# Patient Record
Sex: MALE | Race: BLACK OR AFRICAN AMERICAN | NOT HISPANIC OR LATINO | URBAN - METROPOLITAN AREA
[De-identification: names, ages, dates, MRNs, and addresses within clinical notes are randomized per-mention and may not be internally consistent; named-entity substitution may affect disease eponyms.]

---

## 2017-01-31 ENCOUNTER — OFFICE VISIT - RIVER FALLS (OUTPATIENT)
Dept: FAMILY MEDICINE | Facility: CLINIC | Age: 47
End: 2017-01-31

## 2017-01-31 ASSESSMENT — MIFFLIN-ST. JEOR: SCORE: 1743.48

## 2017-02-13 ENCOUNTER — OFFICE VISIT - RIVER FALLS (OUTPATIENT)
Dept: FAMILY MEDICINE | Facility: CLINIC | Age: 47
End: 2017-02-13

## 2017-02-13 ASSESSMENT — MIFFLIN-ST. JEOR: SCORE: 1721.71

## 2017-03-07 ENCOUNTER — OFFICE VISIT - RIVER FALLS (OUTPATIENT)
Dept: FAMILY MEDICINE | Facility: CLINIC | Age: 47
End: 2017-03-07

## 2017-03-07 ASSESSMENT — MIFFLIN-ST. JEOR: SCORE: 1726.25

## 2017-04-07 ENCOUNTER — OFFICE VISIT - RIVER FALLS (OUTPATIENT)
Dept: FAMILY MEDICINE | Facility: CLINIC | Age: 47
End: 2017-04-07

## 2017-04-07 ASSESSMENT — MIFFLIN-ST. JEOR: SCORE: 1720.8

## 2017-04-19 ENCOUNTER — OFFICE VISIT - RIVER FALLS (OUTPATIENT)
Dept: FAMILY MEDICINE | Facility: CLINIC | Age: 47
End: 2017-04-19

## 2017-05-08 ENCOUNTER — OFFICE VISIT - RIVER FALLS (OUTPATIENT)
Dept: FAMILY MEDICINE | Facility: CLINIC | Age: 47
End: 2017-05-08

## 2017-05-08 ASSESSMENT — MIFFLIN-ST. JEOR: SCORE: 1737.13

## 2021-07-05 ENCOUNTER — OFFICE VISIT - RIVER FALLS (OUTPATIENT)
Dept: FAMILY MEDICINE | Facility: CLINIC | Age: 51
End: 2021-07-05

## 2022-01-11 ENCOUNTER — OFFICE VISIT - RIVER FALLS (OUTPATIENT)
Dept: FAMILY MEDICINE | Facility: CLINIC | Age: 52
End: 2022-01-11

## 2022-02-11 VITALS
DIASTOLIC BLOOD PRESSURE: 68 MMHG | TEMPERATURE: 98.1 F | DIASTOLIC BLOOD PRESSURE: 80 MMHG | SYSTOLIC BLOOD PRESSURE: 124 MMHG | BODY MASS INDEX: 34.95 KG/M2 | BODY MASS INDEX: 35.45 KG/M2 | WEIGHT: 212.8 LBS | OXYGEN SATURATION: 98 % | HEART RATE: 70 BPM | SYSTOLIC BLOOD PRESSURE: 148 MMHG | WEIGHT: 210 LBS | TEMPERATURE: 98.2 F | HEART RATE: 45 BPM | HEIGHT: 65 IN

## 2022-02-11 VITALS
HEART RATE: 84 BPM | SYSTOLIC BLOOD PRESSURE: 126 MMHG | SYSTOLIC BLOOD PRESSURE: 122 MMHG | BODY MASS INDEX: 34.89 KG/M2 | HEART RATE: 63 BPM | DIASTOLIC BLOOD PRESSURE: 84 MMHG | OXYGEN SATURATION: 96 % | HEIGHT: 65 IN | WEIGHT: 209.4 LBS | WEIGHT: 214.2 LBS | HEIGHT: 65 IN | BODY MASS INDEX: 35.69 KG/M2 | TEMPERATURE: 97.5 F | DIASTOLIC BLOOD PRESSURE: 82 MMHG

## 2022-02-11 VITALS
WEIGHT: 210.4 LBS | TEMPERATURE: 97.2 F | HEIGHT: 65 IN | SYSTOLIC BLOOD PRESSURE: 152 MMHG | OXYGEN SATURATION: 98 % | HEART RATE: 66 BPM | SYSTOLIC BLOOD PRESSURE: 124 MMHG | TEMPERATURE: 98 F | BODY MASS INDEX: 35.06 KG/M2 | OXYGEN SATURATION: 98 % | HEIGHT: 65 IN | BODY MASS INDEX: 34.85 KG/M2 | WEIGHT: 209.2 LBS | HEART RATE: 55 BPM | DIASTOLIC BLOOD PRESSURE: 80 MMHG | DIASTOLIC BLOOD PRESSURE: 78 MMHG

## 2022-02-11 VITALS
SYSTOLIC BLOOD PRESSURE: 148 MMHG | BODY MASS INDEX: 34.28 KG/M2 | WEIGHT: 206 LBS | DIASTOLIC BLOOD PRESSURE: 81 MMHG | TEMPERATURE: 98.1 F | HEART RATE: 60 BPM

## 2022-02-16 NOTE — NURSING NOTE
Comprehensive Intake Entered On:  7/5/2021 2:16 PM CDT    Performed On:  7/5/2021 2:09 PM CDT by Rehana Olea               Summary   Chief Complaint :   Work Comp- Hit head on Microwave door yesterday, has HA, dizziness.   Weight Measured :   206.0 lb(Converted to: 206 lb 0 oz, 93.440 kg)    Systolic Blood Pressure :   148 mmHg (HI)    Diastolic Blood Pressure :   81 mmHg (HI)    Mean Arterial Pressure :   103 mmHg   Peripheral Pulse Rate :   60 bpm   BP Site :   Right arm   BP Method :   Electronic   HR Method :   Electronic   Temperature Tympanic :   98.1 DegF(Converted to: 36.7 DegC)    Rehana Olea - 7/5/2021 2:09 PM CDT   Health Status   Allergies Verified? :   Yes   Medication History Verified? :   Yes   Medical History Verified? :   Yes   Pre-Visit Planning Status :   Completed   Tobacco Use? :   Former smoker   Rehana Olea - 7/5/2021 2:09 PM CDT   Consents   Consent for Immunization Exchange :   Consent Granted   Consent for Immunizations to Providers :   Consent Granted   Rehana Olea - 7/5/2021 2:09 PM CDT   Meds / Allergies   (As Of: 7/5/2021 2:16:45 PM CDT)   Allergies (Active)   amitriptyline  Estimated Onset Date:   Unspecified ; Reactions:   rash ; Created By:   Derek Garcia MD; Reaction Status:   Active ; Category:   Drug ; Substance:   amitriptyline ; Type:   Allergy ; Updated By:   Derek Garcia MD; Reviewed Date:   7/5/2021 2:16 PM CDT      Diflucan  Estimated Onset Date:   Unspecified ; Created By:   Rach Manzano; Reaction Status:   Active ; Category:   Drug ; Substance:   Diflucan ; Type:   Allergy ; Updated By:   Rach Manzano; Reviewed Date:   7/5/2021 2:16 PM CDT      Nizoral  Estimated Onset Date:   Unspecified ; Created By:   Rach Manzano; Reaction Status:   Active ; Category:   Drug ; Substance:   Nizoral ; Type:   Allergy ; Updated By:   Rach Manzano; Reviewed Date:   7/5/2021 2:16 PM CDT      predniSONE  Estimated Onset Date:    Unspecified ; Reactions:   increased depression ; Created By:   Mia Mac MA; Reaction Status:   Active ; Category:   Drug ; Substance:   predniSONE ; Type:   Allergy ; Updated By:   Mia Mac MA; Reviewed Date:   7/5/2021 2:16 PM CDT      Vicodin  Estimated Onset Date:   Unspecified ; Reactions:   Vomiting ; Created By:   Rach Manzano; Reaction Status:   Active ; Category:   Drug ; Substance:   Vicodin ; Type:   Allergy ; Updated By:   Rach Manzano; Reviewed Date:   7/5/2021 2:16 PM CDT      Visine  Estimated Onset Date:   Unspecified ; Created By:   Rach Manzano; Reaction Status:   Active ; Category:   Drug ; Substance:   Visine ; Type:   Allergy ; Updated By:   Rach Manzano; Reviewed Date:   7/5/2021 2:16 PM CDT        Medication List   (As Of: 7/5/2021 2:16:45 PM CDT)   Prescription/Discharge Order    metoprolol  :   metoprolol ; Status:   Prescribed ; Ordered As Mnemonic:   metoprolol succinate 50 mg oral tablet, extended release ; Simple Display Line:   50 mg, 1 tab(s), PO, Daily, 30 tab(s), 5 Refill(s) ; Ordering Provider:   Saeid Rosado MD; Catalog Code:   metoprolol ; Order Dt/Tm:   1/31/2017 1:29:11 PM CST          amoxicillin-clavulanate  :   amoxicillin-clavulanate ; Status:   Completed ; Ordered As Mnemonic:   Augmentin 875 mg oral tablet ; Simple Display Line:   1 tab(s), po, bid, for 7 day(s), 14 tab(s), 0 Refill(s) ; Ordering Provider:   Saeid Rosado MD; Catalog Code:   amoxicillin-clavulanate ; Order Dt/Tm:   5/8/2017 2:19:13 PM CDT            Home Meds    venlafaxine  :   venlafaxine ; Status:   Documented ; Ordered As Mnemonic:   Effexor  mg oral capsule, extended release ; Simple Display Line:   150 mg, 1 cap(s), PO, daily, 0 Refill(s) ; Catalog Code:   venlafaxine ; Order Dt/Tm:   10/12/2015 5:51:22 PM CDT ; Comment:   change from bid to daily          omeprazole  :   omeprazole ; Status:   Documented ; Ordered As Mnemonic:    omeprazole 40 mg oral delayed release capsule ; Simple Display Line:   40 mg, 1 cap(s), Oral, daily, 90 cap(s), 0 Refill(s) ; Catalog Code:   omeprazole ; Order Dt/Tm:   7/5/2021 2:14:24 PM CDT          multivitamin with minerals  :   multivitamin with minerals ; Status:   Documented ; Ordered As Mnemonic:   multivitamin with minerals (w/ Iron) ; Simple Display Line:   0 Refill(s) ; Catalog Code:   multivitamin with minerals ; Order Dt/Tm:   8/9/2016 10:00:54 AM CDT          meclizine  :   meclizine ; Status:   Documented ; Ordered As Mnemonic:   meclizine 25 mg oral tablet ; Simple Display Line:   25 mg, 1 tab(s), Oral, tid, PRN: for nausea/vomiting, 30 tab(s), 0 Refill(s) ; Catalog Code:   meclizine ; Order Dt/Tm:   7/5/2021 2:16:39 PM CDT          lithium  :   lithium ; Status:   Completed ; Ordered As Mnemonic:   lithium 450 mg oral tablet, extended release ; Simple Display Line:   900 mg, 2 tab(s), PO, hs, 0 Refill(s) ; Catalog Code:   lithium ; Order Dt/Tm:   10/12/2015 5:52:06 PM CDT          cloNIDine  :   cloNIDine ; Status:   Documented ; Ordered As Mnemonic:   cloNIDine 0.1 mg oral tablet ; Simple Display Line:   0.1 mg, 1 tab(s), po, daily, at HS, 0 Refill(s) ; Catalog Code:   cloNIDine ; Order Dt/Tm:   4/6/2016 3:14:32 PM CDT          albuterol  :   albuterol ; Status:   Documented ; Ordered As Mnemonic:   albuterol 90 mcg/inh inhalation aerosol ; Simple Display Line:   2 puff(s), INH, QID, 51 g, 0 Refill(s) ; Catalog Code:   albuterol ; Order Dt/Tm:   12/29/2016 9:32:08 AM CST          albuterol-ipratropium  :   albuterol-ipratropium ; Status:   Documented ; Ordered As Mnemonic:   albuterol-ipratropium 2.5 mg-0.5 mg/3 mL inhalation solution ; Simple Display Line:   3 mL, neb, qid, 0 Refill(s) ; Catalog Code:   albuterol-ipratropium ; Order Dt/Tm:   12/28/2016 1:15:56 PM CST            Social History   Social History   (As Of: 7/5/2021 2:16:45 PM CDT)   Alcohol:        Current, 1-2 times per week, 2  drinks/episode average.  2 drinks/episode maximum.   (Last Updated: 3/17/2016 2:37:09 PM CDT by Deonna Yoo RN)    Comments:  11/11/2016 4:26 PM - Manuel Fry MD: 2 drinks per month   (Last Updated: 11/11/2016 4:26:19 PM CST by Manuel Fry MD)          Tobacco:        Former smoker, Cigarettes   Comments:  11/19/2015 4:52 PM - Mirna Hogue CMA: Quit 15yrs ago   (Last Updated: 11/19/2015 4:52:48 PM CST by Mirna Hogue CMA)   Former smoker   (Last Updated: 11/11/2016 4:26:19 PM CST by Manuel Fry MD)   Former smoker, quit more than 30 days ago   (Last Updated: 7/5/2021 2:14:59 PM CDT by Rehana Olea)          Electronic Cigarette/Vaping:        Electronic Cigarette Use: Never.   (Last Updated: 7/5/2021 2:15:02 PM CDT by Rehana Olea)          Employment/School:        Employed   Comments:  11/11/2016 4:26 PM - Manuel Fry MD: [employer]   (Last Updated: 11/11/2016 4:26:49 PM CST by Manuel Fry MD)          Home/Environment:        Marital status: Single.   (Last Updated: 11/11/2016 4:26:49 PM CST by Manuel Fry MD)
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
## 2022-02-16 NOTE — PROGRESS NOTES
Patient:   MISSY LOMAX            MRN: [id number]            FIN: [id number]               Age:   46 years     Sex:  Male     :  1970   Associated Diagnoses:   Acute infection of left ear; Mild intermittent asthma with acute exacerbation   Author:   Saeid Rosado MD      Chief Complaint   2017 1:50 PM CDT    Ear Check on left ear. Pt was cleaning ear with a Qtip after a shower and felt that there was water in it. States that the ear is not in any pain but feels plugged. Pt would also like a refill on his lorazepam.      History of Present Illness             The patient presents with earache.  The location of the earache is the left ear.  The earache is characterized by pain, sensation of fullness and muffled hearing.  The severity of the earache is mild.  The earache is improving.  The earache has lasted for 3 day(s).  The context of the earache: occurred in association with illness.  Relieving factors consist of none.  Associated symptoms consist of denies fever and denies cough.     Patient confirms that he has been getting out and being more active. Asthma symptoms have improved. He continues to use his nebulizer regularly for symptoms and finds good benefit from using it.      Review of Systems   Constitutional:  No fever, No chills.    Ear/Nose/Mouth/Throat:  Ear pain.    Respiratory:  No shortness of breath, No cough.    Musculoskeletal:  No back pain.    Integumentary:  No rash.    Neurologic:  No headache.       Health Status   Allergies:    Allergic Reactions (Selected)  Severity Not Documented  Amitriptyline (Rash)  Diflucan (No reactions were documented)  Nizoral (No reactions were documented)  PredniSONE (Increased depression)  Vicodin (Vomiting)  Visine (No reactions were documented)   Medications:  (Selected)   Prescriptions  Prescribed  LORazepam 0.5 mg oral tablet: 1 tab(s) ( 0.5 mg ), PO, TID, PRN: for anxiety, # 12 tab(s), 0 Refill(s), Type: Maintenance, called to pharmacy  (Rx)  Neurontin 300 mg oral capsule: 1 cap(s) ( 300 mg ), po, hs, # 30 cap(s), 2 Refill(s), Type: Maintenance, Pharmacy: [id number], 1 cap(s) po hs  metoprolol succinate 50 mg oral tablet, extended release: 1 tab(s) ( 50 mg ), PO, Daily, # 30 tab(s), 5 Refill(s), Type: Maintenance, Pharmacy: [id number], 1 tab(s) po daily  omeprazole 20 mg oral delayed release capsule: 1 cap(s) ( 20 mg ), po, bid, # 60 cap(s), 3 Refill(s), Type: Maintenance, Pharmacy: [id number], 1 cap(s) po bid  Documented Medications  Documented  ARIPiprazole 10 mg oral tablet: 1 tab(s) ( 10 mg ), po, daily, 0 Refill(s), Type: Maintenance  Abilify 10 mg oral tablet: 1.5 tab(s) ( 15 mg ), po, daily, Instructions: Take 5mg in the morning and 10mg at bedtime, 0 Refill(s), Type: Maintenance  Effexor  mg oral capsule, extended release: 1 cap(s) ( 150 mg ), PO, daily, 0 Refill(s), Type: Maintenance  albuterol 90 mcg/inh inhalation aerosol: 2 puff(s), INH, QID, # 51 g, 0 Refill(s), Type: Maintenance  albuterol-ipratropium 2.5 mg-0.5 mg/3 mL inhalation solution: 3 mL, neb, qid, 0 Refill(s), Type: Maintenance  cloNIDine 0.1 mg oral tablet: 1 tab(s) ( 0.1 mg ), po, daily, Instructions: at HS, 0 Refill(s), Type: Maintenance  lactobacillus rhamnosus GG: 15 billion cell capsule, po, tid, Instructions: with meals, 0 Refill(s), Type: Maintenance  lithium 450 mg oral tablet, extended release: 2 tab(s) ( 900 mg ), PO, hs, 0 Refill(s), Type: Maintenance  multivitamin with minerals (w/ Iron): 0 Refill(s), Type: Maintenance  oseltamivir 75 mg oral capsule: 1 cap(s) ( 75 mg ), po, bid, 0 Refill(s), Type: Maintenance  traMADol 50 mg oral tablet: 1 tab(s) ( 50 mg ), PO, q6 hrs, PRN: for pain, # 10 tab(s), 0 Refill(s), Type: Maintenance   Problem list:    All Problems  PTSD (post-traumatic stress disorder) / SNOMED CT 52290547 / Confirmed  Obesity / SNOMED CT 3491457267 / Probable  Mild intermittent asthma with acute exacerbation /  SNOMED CT 7597391231 / Confirmed  Prediabetes / SNOMED CT 08230620 / Confirmed  Hyperlipidemia / SNOMED CT 52787866 / Confirmed  History of meningitis / SNOMED CT 750909501 / Confirmed  History of Clostridium difficile colitis / SNOMED CT 622232609 / Confirmed  Bipolar disorder, unspecified / SNOMED CT 40091317 / Confirmed  Resolved: Inpatient stay / SNOMED CT 375361423  Resolved: Inpatient stay / SNOMED CT 328165387  Resolved: Inpatient stay / SNOMED CT 900480656      Physical Examination   Vital Signs   4/19/2017 1:50 PM CDT Temperature Tympanic 98.2 DegF    Peripheral Pulse Rate 70 bpm    Pulse Site Radial artery    HR Method Manual    Systolic Blood Pressure 148 mmHg  HI    Diastolic Blood Pressure 80 mmHg    Mean Arterial Pressure 103 mmHg    BP Site Right arm    BP Method Manual      Measurements from flowsheet : Measurements   4/19/2017 1:50 PM CDT    Weight Measured - Standard                210.0 lb     General:  Alert and oriented, No acute distress.    Eye:  Pupils are equal, round and reactive to light, Normal conjunctiva.    HENT:  Oral mucosa is moist.         Ear: Tympanic membrane ( Bulging, Erythematous ).    Neck:  Supple.    Respiratory:  Respirations are non-labored.    Cardiovascular:  Normal rate, Regular rhythm, No edema.    Gastrointestinal:  Non-distended.    Musculoskeletal:  Normal gait.    Integumentary:  Warm, No rash.    Psychiatric:  Cooperative, Appropriate mood & affect, Normal judgment.       Impression and Plan   Diagnosis     Acute infection of left ear (SEE97-HL H66.92).     Mild intermittent asthma with acute exacerbation (SMS38-NX J45.21).     Plan:  Exam and symptoms indicate an infection.  Will treat infection with Septra DS    which is an antibiotic.  Finish entire course of antibiotic unless instructed otherwise.  Will have him continue using his nebulizer as needed for Asthma symptoms    Infections behind the eardrum are common, especially in children.  Often they occur  after a preceding common cold.  Antibiotics might reduce pain and reduce the duration of the infection.  Fluid behind the eardrum may persist for about two weeks after the infection has been cleared.  If pain is worsening, hearing is changing, drainage begins from the ear or worsens, you should be rechecked in the clinic. .      ITarah CMA, acted solely as a scribe for, and in the presence of Dr. Saeid Rosado who performed the service.

## 2022-02-16 NOTE — PROGRESS NOTES
Patient:   MISSY LOMAX            MRN: [id number]            FIN: [id number]               Age:   46 years     Sex:  Male     :  1970   Associated Diagnoses:   Rib pain on right side; Acute left otitis media   Author:   Saeid Rosado MD      Chief Complaint   2017 1:43 PM CDT     pt here for fu from ER for chest pain, he still has the pain, right lower side of chest, still having the pain      History of Present Illness   see chief complaint as noted above and confirmed with the patient     46 year old male here today following up from the emergency room due to chest pain. He was playing around with some children, wrestling when he had a child sit on his chest, he say's the child was about 65-80 pounds. He is having pain in the right lower chest. He had x-rays done showing no fractures or concerns, EKG was normal,  He is still having pain today, he is tender to touch, he wonders what he is to do to help with the pain. He denies shortness of breath, denies nausea, denies vomiting.     Left ear pain: Had an ear infection, was treated with a round of antibiotics, say's the pain is still present.       Review of Systems   Constitutional:  No fever.    Ear/Nose/Mouth/Throat:  No nasal congestion, No sore throat     Ear pain: Left.    Respiratory:  No shortness of breath, No cough, No wheezing.    Cardiovascular:       Chest pain: Right sided, Midsternal.    Gastrointestinal:  No nausea, No vomiting, No diarrhea.    Integumentary:  No rash.    Neurologic:  Alert and oriented X4, No headache.             Health Status   Allergies:    Allergic Reactions (Selected)  Severity Not Documented  Amitriptyline (Rash)  Diflucan (No reactions were documented)  Nizoral (No reactions were documented)  PredniSONE (Increased depression)  Vicodin (Vomiting)  Visine (No reactions were documented)   Medications:  (Selected)   Prescriptions  Prescribed  metoprolol succinate 50 mg oral tablet, extended release: 1 tab(s)  ( 50 mg ), PO, Daily, # 30 tab(s), 5 Refill(s), Type: Maintenance, Pharmacy: [facility] #[id number], 1 tab(s) po daily  Documented Medications  Documented  Effexor  mg oral capsule, extended release: 1 cap(s) ( 150 mg ), PO, daily, 0 Refill(s), Type: Maintenance  albuterol 90 mcg/inh inhalation aerosol: 2 puff(s), INH, QID, # 51 g, 0 Refill(s), Type: Maintenance  albuterol-ipratropium 2.5 mg-0.5 mg/3 mL inhalation solution: 3 mL, neb, qid, 0 Refill(s), Type: Maintenance  cloNIDine 0.1 mg oral tablet: 1 tab(s) ( 0.1 mg ), po, daily, Instructions: at HS, 0 Refill(s), Type: Maintenance  lithium 450 mg oral tablet, extended release: 2 tab(s) ( 900 mg ), PO, hs, 0 Refill(s), Type: Maintenance  multivitamin with minerals (w/ Iron): 0 Refill(s), Type: Maintenance   Problem list:    All Problems  Bipolar disorder, unspecified / SNOMED CT 98793575 / Confirmed  History of Clostridium difficile colitis / SNOMED CT 621353404 / Confirmed  History of meningitis / SNOMED CT 033808075 / Confirmed  Hyperlipidemia / SNOMED CT 83984358 / Confirmed  Prediabetes / SNOMED CT 41550850 / Confirmed  Mild intermittent asthma with acute exacerbation / SNOMED CT 9764395069 / Confirmed  Obesity / SNOMED CT 1245315799 / Probable  PTSD (post-traumatic stress disorder) / SNOMED CT 94839878 / Confirmed  Resolved: Inpatient stay / SNOMED CT 833016214  Resolved: Inpatient stay / SNOMED CT 063386890  Resolved: Inpatient stay / SNOMED CT 499776814      Histories   Past Medical History:    Active  History of meningitis (081441760): Onset on 12/24/2016 at 46 years.  History of Clostridium difficile colitis (180756340): Onset on 12/24/2016 at 46 years.  Bipolar disorder, unspecified (66417171)  PTSD (post-traumatic stress disorder) (12376547)  Mild intermittent asthma with acute exacerbation (2280005469)  Resolved  Inpatient stay (615025843): Onset on 1/10/2017 at 46 years.  Resolved on 1/11/2017 at 46 years.  Comments:  1/17/2017 CST 9:33 AM CST -  Abbey Esparza  @[facility] [place]  Inpatient stay (961330821): Onset on 12/24/2016 at 46 years.  Resolved on 12/27/2016 at 46 years.  Comments:  1/10/2017 CST 7:31 AM CST - Abbey Esparza  @[facility] - Meningitis, influenza A, pneumonia right lower lobe, C difficile  Inpatient stay (692336034): Onset on 7/3/2016 at 45 years.  Resolved on 7/5/2016 at 45 years.  Comments:  1/10/2017 CST 7:32 AM BART - Abbey Esparza  @[facility] - Dental abscess   Family History:    Patient was adopted.    Procedure history:    MR angiography of head with contrast ([id number]) on 4/27/2016 at 45 Years.  MRA head ([id number]) on 4/27/2016 at 45 Years.  Comments:  5/3/2016 9:06 AM - Mia Mac MA  Head and brain with IV contrast  MRA head ([id number]) on 4/27/2016 at 45 Years.  Appendectomy ([id number]).   Social History:        Alcohol Assessment            Current, 1-2 times per week, 2 drinks/episode average.  2 drinks/episode maximum.                     Comments:                      11/11/2016 - Manuel Fry MD                     2 drinks per month      Tobacco Assessment            Former smoker            Former smoker, Cigarettes                     Comments:                      11/19/2015 - Mirna Hogue CMA                     Quit 15yrs ago      Employment and Education Assessment            Employed                     Comments:                      11/11/2016 - Manuel Fry MD                     [employer]      Home and Environment Assessment            Marital status: Single.        Physical Examination   Vital Signs   5/8/2017 1:43 PM CDT Temperature Tympanic 98.1 DegF    Peripheral Pulse Rate 45 bpm  LOW    Pulse Site Radial artery    Systolic Blood Pressure 124 mmHg    Diastolic Blood Pressure 68 mmHg    Mean Arterial Pressure 87 mmHg    BP Site Right arm    Oxygen Saturation 98 %      Measurements from flowsheet : Measurements   5/8/2017 1:43 PM CDT Height Measured - Standard 65 in    Weight Measured - Standard 212.8  lb    BSA 2.1 m2    Body Mass Index 35.41 kg/m2      General:  Alert and oriented, No acute distress.    Eye:  Pupils are equal, round and reactive to light, Normal conjunctiva.    HENT:  Oral mucosa is moist, left ear-otitis media .    Neck:  Supple.    Respiratory:  Respirations are non-labored.    Cardiovascular:  Normal rate, Regular rhythm, No edema.    Gastrointestinal:  Non-distended.    Musculoskeletal:  Normal gait.    Integumentary:  Warm, No rash.    Psychiatric:  Cooperative, Appropriate mood & affect, Normal judgment.       Review / Management   Results review      Impression and Plan       Diagnosis     Rib pain on right side (CEC70-QZ R07.81).     Acute left otitis media (IWN08-II H66.92).     Plan:  Have a broken rib. Need to rest, try not to lift objects over twenty pounds.     Will send in Augmentin for 7 days to treat ear infection.     Please return if still having troubles.     Will write note for work. .    Summary:  Reviewed expected cause with patient    What to watch for     and when to return      Note for work written to not lift more than 20lbs for the next week and a half. .    Mia SHAW Medical Assistant acted solely as a scribe for, and in presence of Dr. Saeid Rosado who performed the services.

## 2022-02-16 NOTE — PROGRESS NOTES
Patient:   MISSY LOMAX            MRN: [id number]            FIN: [id number]               Age:   46 years     Sex:  Male     :  1970   Associated Diagnoses:   Acute pharyngitis; Tongue lesion   Author:   Abdiel Lopez PA-C      Chief Complaint   3/7/2017 9:17 AM CST     Pt here for bumps on tongue x 4-5 days.      History of Present Illness   Chief complaint and symptoms noted above and confirmed with patient    5 days ago developed bumps on his tongue and sore throat, hurts to swallow           Review of Systems   Constitutional:  Fever.    Ear/Nose/Mouth/Throat:  Sore throat.    Respiratory:  Negative.       Health Status   Allergies:    Allergic Reactions (Selected)  Severity Not Documented  Amitriptyline (Rash)  Diflucan (No reactions were documented)  Nizoral (No reactions were documented)  PredniSONE (Increased depression)  Vicodin (Vomiting)  Visine (No reactions were documented)   Medications:  (Selected)   Prescriptions  Prescribed  LORazepam 0.5 mg oral tablet: 1 tab(s) ( 0.5 mg ), PO, TID, PRN: for anxiety, # 12 tab(s), 0 Refill(s), Type: Maintenance, called to pharmacy (Rx)  Neurontin 300 mg oral capsule: 1 cap(s) ( 300 mg ), po, hs, # 30 cap(s), 2 Refill(s), Type: Maintenance, Pharmacy: [facility] #[id number], 1 cap(s) po hs  metoprolol succinate 50 mg oral tablet, extended release: 1 tab(s) ( 50 mg ), PO, Daily, # 30 tab(s), 5 Refill(s), Type: Maintenance, Pharmacy: [facility] #[id number], 1 tab(s) po daily  omeprazole 20 mg oral delayed release capsule: 1 cap(s) ( 20 mg ), po, bid, # 60 cap(s), 3 Refill(s), Type: Maintenance, Pharmacy: [facility] #[id number], 1 cap(s) po bid  Documented Medications  Documented  ARIPiprazole 10 mg oral tablet: 1 tab(s) ( 10 mg ), po, daily, 0 Refill(s), Type: Maintenance  Abilify 10 mg oral tablet: 1.5 tab(s) ( 15 mg ), po, daily, Instructions: Take 5mg in the morning and 10mg at bedtime, 0 Refill(s), Type: Maintenance  Effexor  mg oral capsule,  extended release: 1 cap(s) ( 150 mg ), PO, daily, 0 Refill(s), Type: Maintenance  albuterol 90 mcg/inh inhalation aerosol: 2 puff(s), INH, QID, # 51 g, 0 Refill(s), Type: Maintenance  albuterol-ipratropium 2.5 mg-0.5 mg/3 mL inhalation solution: 3 mL, neb, qid, 0 Refill(s), Type: Maintenance  cloNIDine 0.1 mg oral tablet: 1 tab(s) ( 0.1 mg ), po, daily, Instructions: at HS, 0 Refill(s), Type: Maintenance  lactobacillus rhamnosus GG: 15 billion cell capsule, po, tid, Instructions: with meals, 0 Refill(s), Type: Maintenance  lithium 450 mg oral tablet, extended release: 2 tab(s) ( 900 mg ), PO, hs, 0 Refill(s), Type: Maintenance  multivitamin with minerals (w/ Iron): 0 Refill(s), Type: Maintenance  oseltamivir 75 mg oral capsule: 1 cap(s) ( 75 mg ), po, bid, 0 Refill(s), Type: Maintenance  traMADol 50 mg oral tablet: 1 tab(s) ( 50 mg ), PO, q6 hrs, PRN: for pain, # 10 tab(s), 0 Refill(s), Type: Maintenance   Problem list:    All Problems (Selected)  Prediabetes / SNOMED CT 57860587 / Confirmed  Bipolar disorder, unspecified / SNOMED CT 64176961 / Confirmed  History of Clostridium difficile colitis / SNOMED CT 584755328 / Confirmed  History of meningitis / SNOMED CT 585601122 / Confirmed  Obesity / SNOMED CT 1927796665 / Probable  Mild intermittent asthma with acute exacerbation / SNOMED CT 9948105336 / Confirmed  PTSD (post-traumatic stress disorder) / SNOMED CT 13892711 / Confirmed  Hyperlipidemia / SNOMED CT 90771186 / Confirmed      Histories   Past Medical History:    Active  History of meningitis (000434541): Onset on 12/24/2016 at 46 years.  History of Clostridium difficile colitis (733027797): Onset on 12/24/2016 at 46 years.  Bipolar disorder, unspecified (90588771)  PTSD (post-traumatic stress disorder) (82788778)  Mild intermittent asthma with acute exacerbation (9705562249)  Resolved  Inpatient stay (418222585): Onset on 1/10/2017 at 46 years.  Resolved on 1/11/2017 at 46 years.  Comments:  1/17/2017 CST  9:33 AM Abbey Drake  @[facility] [place]  Inpatient stay ([id number]): Onset on 12/24/2016 at 46 years.  Resolved on 12/27/2016 at 46 years.  Comments:  1/10/2017 CST 7:31 AM BART Esparza Snehalri  @[facility] - Meningitis, influenza A, pneumonia right lower lobe, C difficile  Inpatient stay ([id number]): Onset on 7/3/2016 at 45 years.  Resolved on 7/5/2016 at 45 years.  Comments:  1/10/2017 CST 7:32 AM Abbey Drake  @[facility] - Dental abscess   Family History:    Patient was adopted.    Procedure history:    MR angiography of head with contrast ([id number]) on 4/27/2016 at 45 Years.  MRA head ([id number]) on 4/27/2016 at 45 Years.  Comments:  5/3/2016 9:06 AM - Mia Mac MA  Head and brain with IV contrast  MRA head ([id number]) on 4/27/2016 at 45 Years.  Appendectomy ([id number]).      Physical Examination   Vital Signs   3/7/2017 9:17 AM CST Temperature Tympanic 98.0 DegF    Peripheral Pulse Rate 66 bpm    HR Method Electronic    Systolic Blood Pressure 152 mmHg  HI    Diastolic Blood Pressure 80 mmHg    Mean Arterial Pressure 104 mmHg    BP Site Right arm    BP Method Manual    Oxygen Saturation 98 %      Measurements from flowsheet : Measurements   3/7/2017 9:17 AM CST Height Measured - Standard 65 in    Weight Measured - Standard 210.4 lb    BSA 2.09 m2    Body Mass Index 35.01 kg/m2      General:  No acute distress.    HENT:  Tympanic membranes are clear, ooropharynx mildly enlarged and inflamed without exudate, maxillary sinus tenderness, small bumps on tip of tongue.    Neck:  Supple, moderate anterior cervical lymphadenopathy, moderate tenderness.    Respiratory:  Lungs are clear to auscultation.       Review / Management   Results review:       Interpretation: rapid strep is negative; culture pending, will call if abnormal results..       Impression and Plan   Diagnosis     Acute pharyngitis (CTE98-PU J02.9).     Tongue lesion (XRI97-YK K14.8).     Summary:  advised to use mix of mylanta and  benadryl mixed 50/50, swish and spit before meals for comfort, follow up if not improving.    Orders     Orders   Requests (Lab):  Strep Grp A AG  IA (Rapid Strep) (Request) (Order): Priority: Urgent, Acute pharyngitis.     Orders   Charges (Evaluation and Management):  [id number] office outpatient visit 15 minutes (Charge) (Order): Quantity: 1, Acute pharyngitis  Tongue lesion.

## 2022-02-16 NOTE — PROGRESS NOTES
Patient:   MISSY LOMAX            MRN: [id number]            FIN: [id number]               Age:   46 years     Sex:  Male     :  1970   Associated Diagnoses:   Acute parotitis   Author:   Saeid Rosado MD      Chief Complaint   2017 3:50 PM CDT     pt here for left side of face swelling, also left ear tender to touch, about one week ago, also sometimes if he moves his head real fast he gets lightheaded or dizzy, has been doing pt excercises for vertigo, no fevers, also right arm gets cold,        History of Present Illness   see chief complaint as noted above and confirmed with the patient     46 year old male here today with concern of left side of face swelling, left ear is tender and painful, he noticed both about one week ago. He has had no known fevers, no sore throat, no headache. Has not had any recent changes in vision, no other symptoms noted at this time.       Review of Systems   Constitutional:  No fever.    Ear/Nose/Mouth/Throat:  No sore throat     Ear pain: Left.    Respiratory:  No shortness of breath, No cough.    Cardiovascular:  No chest pain.    Gastrointestinal:  No nausea, No vomiting, No diarrhea.    Integumentary:  left side face swelling , No rash.    Neurologic:  Alert and oriented X4, No headache.             Health Status   Allergies:    Allergic Reactions (Selected)  Severity Not Documented  Amitriptyline (Rash)  Diflucan (No reactions were documented)  Nizoral (No reactions were documented)  PredniSONE (Increased depression)  Vicodin (Vomiting)  Visine (No reactions were documented)   Medications:  (Selected)   Prescriptions  Prescribed  LORazepam 0.5 mg oral tablet: 1 tab(s) ( 0.5 mg ), PO, TID, PRN: for anxiety, # 12 tab(s), 0 Refill(s), Type: Maintenance, called to pharmacy (Rx)  Neurontin 300 mg oral capsule: 1 cap(s) ( 300 mg ), po, hs, # 30 cap(s), 2 Refill(s), Type: Maintenance, Pharmacy: [facility] #[id number], 1 cap(s) po hs  metoprolol succinate 50 mg oral  tablet, extended release: 1 tab(s) ( 50 mg ), PO, Daily, # 30 tab(s), 5 Refill(s), Type: Maintenance, Pharmacy: [facility] #[id number], 1 tab(s) po daily  omeprazole 20 mg oral delayed release capsule: 1 cap(s) ( 20 mg ), po, bid, # 60 cap(s), 3 Refill(s), Type: Maintenance, Pharmacy: [facility] #[id number], 1 cap(s) po bid  Documented Medications  Documented  ARIPiprazole 10 mg oral tablet: 1 tab(s) ( 10 mg ), po, daily, 0 Refill(s), Type: Maintenance  Abilify 10 mg oral tablet: 1.5 tab(s) ( 15 mg ), po, daily, Instructions: Take 5mg in the morning and 10mg at bedtime, 0 Refill(s), Type: Maintenance  Effexor  mg oral capsule, extended release: 1 cap(s) ( 150 mg ), PO, daily, 0 Refill(s), Type: Maintenance  albuterol 90 mcg/inh inhalation aerosol: 2 puff(s), INH, QID, # 51 g, 0 Refill(s), Type: Maintenance  albuterol-ipratropium 2.5 mg-0.5 mg/3 mL inhalation solution: 3 mL, neb, qid, 0 Refill(s), Type: Maintenance  cloNIDine 0.1 mg oral tablet: 1 tab(s) ( 0.1 mg ), po, daily, Instructions: at HS, 0 Refill(s), Type: Maintenance  lactobacillus rhamnosus GG: 15 billion cell capsule, po, tid, Instructions: with meals, 0 Refill(s), Type: Maintenance  lithium 450 mg oral tablet, extended release: 2 tab(s) ( 900 mg ), PO, hs, 0 Refill(s), Type: Maintenance  multivitamin with minerals (w/ Iron): 0 Refill(s), Type: Maintenance  oseltamivir 75 mg oral capsule: 1 cap(s) ( 75 mg ), po, bid, 0 Refill(s), Type: Maintenance  traMADol 50 mg oral tablet: 1 tab(s) ( 50 mg ), PO, q6 hrs, PRN: for pain, # 10 tab(s), 0 Refill(s), Type: Maintenance   Problem list:    All Problems  Bipolar disorder, unspecified / SNOMED CT 83752177 / Confirmed  History of Clostridium difficile colitis / SNOMED CT 345095415 / Confirmed  History of meningitis / SNOMED CT 911862467 / Confirmed  Hyperlipidemia / SNOMED CT 54419601 / Confirmed  Prediabetes / SNOMED CT 46371762 / Confirmed  Mild intermittent asthma with acute exacerbation / SNOMED CT  3471113169 / Confirmed  Obesity / SNOMED CT 6376400905 / Probable  PTSD (post-traumatic stress disorder) / SNOMED CT 76670723 / Confirmed  Resolved: Inpatient stay / SNOMED CT 074948904  Resolved: Inpatient stay / SNOMED CT 765572361  Resolved: Inpatient stay / SNOMED CT 552697061      Histories   Past Medical History:    Active  History of meningitis (694486525): Onset on 12/24/2016 at 46 years.  History of Clostridium difficile colitis (874138425): Onset on 12/24/2016 at 46 years.  Bipolar disorder, unspecified (54934506)  PTSD (post-traumatic stress disorder) (45142371)  Mild intermittent asthma with acute exacerbation (9862040989)  Resolved  Inpatient stay (923484177): Onset on 1/10/2017 at 46 years.  Resolved on 1/11/2017 at 46 years.  Comments:  1/17/2017 CST 9:33 AM Abbey Drake  @[facility] [place]  Inpatient stay ([id number]): Onset on 12/24/2016 at 46 years.  Resolved on 12/27/2016 at 46 years.  Comments:  1/10/2017 CST 7:31 AM Abbey Drake  @[facility] - Meningitis, influenza A, pneumonia right lower lobe, C difficile  Inpatient stay ([id number]): Onset on 7/3/2016 at 45 years.  Resolved on 7/5/2016 at 45 years.  Comments:  1/10/2017 CST 7:32 AM Abbey Drake  @[facility] - Dental abscess   Family History:    Patient was adopted.    Procedure history:    MR angiography of head with contrast ([id number]) on 4/27/2016 at 45 Years.  MRA head ([id number]) on 4/27/2016 at 45 Years.  Comments:  5/3/2016 9:06 AM - Mia Mac MA  Head and brain with IV contrast  MRA head ([id number]) on 4/27/2016 at 45 Years.  Appendectomy ([id number]).   Social History:        Alcohol Assessment            Current, 1-2 times per week, 2 drinks/episode average.  2 drinks/episode maximum.                     Comments:                      11/11/2016 - Manuel Fry MD                     2 drinks per month      Tobacco Assessment            Former smoker            Former smoker, Cigarettes                      Comments:                      11/19/2015 - Lennie FOLEY, Mirna                     Quit 15yrs ago      Employment and Education Assessment            Employed                     Comments:                      11/11/2016 - Manuel Fry MD                     [employer]      Home and Environment Assessment            Marital status: Single.        Physical Examination   Vital Signs   4/7/2017 3:50 PM CDT Temperature Tympanic 97.2 DegF  LOW    Peripheral Pulse Rate 55 bpm  LOW    Pulse Site Radial artery    Systolic Blood Pressure 124 mmHg    Diastolic Blood Pressure 78 mmHg    Mean Arterial Pressure 93 mmHg    BP Site Right arm    Oxygen Saturation 98 %      Measurements from flowsheet : Measurements   4/7/2017 3:50 PM CDT Height Measured - Standard 65 in    Weight Measured - Standard 209.2 lb    BSA 2.08 m2    Body Mass Index 34.81 kg/m2      General:  Alert and oriented, No acute distress.    Eye:  Pupils are equal, round and reactive to light, Normal conjunctiva.    HENT:  Oral mucosa is moist.    Neck:  Supple.    Respiratory:  Respirations are non-labored.    Cardiovascular:  Normal rate, Regular rhythm, No edema.    Gastrointestinal:  Non-distended.    Musculoskeletal:  Normal gait.    Integumentary:  Warm, No rash, not much swelling present-patient is very tender to touch near parotid gland .    Psychiatric:  Cooperative, Appropriate mood & affect, Normal judgment.       Review / Management   Results review      Impression and Plan       Diagnosis     Acute parotitis (FQA62-AU K11.21).     Plan:  Parotid gland infected.     Will send in Keflex 500 mg tid for 10 days     Finish complete antibiotic and if symptoms do not improve may need to go to the dentist for further evaluation.     Use warm compress as needed for relief of pain.     Can also use Tylenol or Ibuprofen as needed for pain. .    Mia SHAW Medical Assistant acted solely as a scribe for, and in presence of Dr. Saeid Rosado who  performed the services.

## 2022-02-16 NOTE — CARE COORDINATION
[facility] Discharge Note    Sources of Information:  [   ] Patient, family member, or caregiver (Please list):CC tried contacting pt at 1155am, not able to leave a message or speak to the pt.   [ X  ] Hospital discharge summary  [   ] Hospital fax  [  X ] List of recent hospitalizations or ED visits  [   ] Other:     Discharged From: [facility] [place] to [place]  Discharge Date: 06-29-17    Diagnosis/Problem: Encephalopathy    Medication Changes: [ X  ] Yes: Metoprolol was decreased to 25mg from 50mg because of persistent bradycardia and dizzy symptoms.  Medication List Updated: [ X  ] Yes    Needs Referral or Lab: [   ] Yes [   X] No  Outpatient Provider Recommendations:hypertension control and hear rate control,   Needs Follow-up Appointment:  [ X  ] Within 5 days of discharge (highly complex visit)  [   ] Within 14 days of discharge (moderately complex visit)    Appointment Made With: AKSHAT   Date: 07/05/17    Additional Information Needed and Requested:  [   ] Yes:  [ X  ] NoCC tried contacting Hank on 07/11/17 at 1246pm, not able to leave a message or speak to the pt.  Patrizia Barajas, CMA

## 2022-02-16 NOTE — PROGRESS NOTES
Patient Information     Name:HANK LOMAX      Address:      [address] [place] [id number]     Sex:Male     YOB: 1970     Phone:[phone]     MRN:[id number]     FIN:[id number]     Location:[facility]     Date of Service:07/05/2021      Primary Care Physician:       NONE ,       Attending Physician:       Manuel Fry MD, [phone]  Subjective       Hank complains of headache and dizziness 1 day after hitting his head on a microwave door at work.  He was at work washing dishes stood up and hit his forehead on the microwave door.  No loss of consciousness.  No severe headache.  No nausea or vomiting.  He has a history of head injury in the past as well of a stroke.  He was able to sleep last night.  No confusion.   Review of Systems        No fever, chills, cough, dyspnea, chest pain.  Objective   Vitals & Measurements    T: 98.1  F (Tympanic)  HR: 60 (Peripheral)  BP: 148/81  WT: 206.0 lb    Physical Exam        Patient appears comfortable and in no distress.  HEENT exam reveals small crease in the forehead cream otherwise unremarkable.  Neck is supple.  No adenopathy or thyromegaly.  Normal range of motion.  Patient is alert and oriented.  Speech fluent.  Memory is intact.  Cranial nerves normal.  Strength and gait normal.  Assessment/Plan       Acute head injury (S09.90XA)         No LOC. Head CT negative for acute problems. Mild symptoms. Return if not promptly improving.         Ordered:          [id number] office o/p est low 20-29 min (Charge), Quantity: 1, Acute head injury          CT Head w/o Contrast (Request), Priority: STAT, Acute head injury          Review Orders for Potential Authorizations, 07/05/21 14:22:29 CDT

## 2022-02-16 NOTE — PROGRESS NOTES
Patient:   MISSY LOMAX            MRN: [id number]            FIN: [id number]               Age:   46 years     Sex:  Male     :  1970   Associated Diagnoses:   Right arm pain   Author:   Saeid Rosado MD      Subjective   Chief complaint 2017 11:14 AM CST   f/u tendenitis. states that it's getting worse.     This 46-year-old man is here because of ongoing right arm pain.  This has been  primarily in his elbow, but he says that it is now also in the biceps.  He would  like to talk with an orthopedist about this.  He notes that the rest of his life is going  so much better now that he is living in a different spot.  He is feeling well.  He would like to tackle this issue.              Health Status   Allergies:    Allergic Reactions (Selected)  Severity Not Documented  Amitriptyline (Rash)  Diflucan (No reactions were documented)  Nizoral (No reactions were documented)  PredniSONE (Increased depression)  Vicodin (Vomiting)  Visine (No reactions were documented)   Medications:  (Selected)   Prescriptions  Prescribed  LORazepam 0.5 mg oral tablet: 1 tab(s) ( 0.5 mg ), PO, TID, PRN: for anxiety, # 12 tab(s), 0 Refill(s), Type: Maintenance, called to pharmacy (Rx)  Neurontin 300 mg oral capsule: 1 cap(s) ( 300 mg ), po, hs, # 30 cap(s), 2 Refill(s), Type: Maintenance, Pharmacy: [id number], 1 cap(s) po hs  metoprolol succinate 50 mg oral tablet, extended release: 1 tab(s) ( 50 mg ), PO, Daily, # 30 tab(s), 5 Refill(s), Type: Maintenance, Pharmacy: [id number], 1 tab(s) po daily  omeprazole 20 mg oral delayed release capsule: 1 cap(s) ( 20 mg ), po, bid, # 60 cap(s), 3 Refill(s), Type: Maintenance, Pharmacy: [id number], 1 cap(s) po bid  Documented Medications  Documented  ARIPiprazole 10 mg oral tablet: 1 tab(s) ( 10 mg ), po, daily, 0 Refill(s), Type: Maintenance  Abilify 10 mg oral tablet: 1.5 tab(s) ( 15 mg ), po, daily, Instructions: Take 5mg in the morning and 10mg  at bedtime, 0 Refill(s), Type: Maintenance  Effexor  mg oral capsule, extended release: 1 cap(s) ( 150 mg ), PO, daily, 0 Refill(s), Type: Maintenance  albuterol 90 mcg/inh inhalation aerosol: 2 puff(s), INH, QID, # 51 g, 0 Refill(s), Type: Maintenance  albuterol-ipratropium 2.5 mg-0.5 mg/3 mL inhalation solution: 3 mL, neb, qid, 0 Refill(s), Type: Maintenance  cloNIDine 0.1 mg oral tablet: 1 tab(s) ( 0.1 mg ), po, daily, Instructions: at HS, 0 Refill(s), Type: Maintenance  lactobacillus rhamnosus GG: 15 billion cell capsule, po, tid, Instructions: with meals, 0 Refill(s), Type: Maintenance  lithium 450 mg oral tablet, extended release: 2 tab(s) ( 900 mg ), PO, hs, 0 Refill(s), Type: Maintenance  multivitamin with minerals (w/ Iron): 0 Refill(s), Type: Maintenance  oseltamivir 75 mg oral capsule: 1 cap(s) ( 75 mg ), po, bid, 0 Refill(s), Type: Maintenance  traMADol 50 mg oral tablet: 1 tab(s) ( 50 mg ), PO, q6 hrs, PRN: for pain, # 10 tab(s), 0 Refill(s), Type: Maintenance  vancomycin 50 mg/mL oral liquid: 2.5 mL ( 125 mg ), po, qid, 0 Refill(s), Type: Maintenance   Problem list:    All Problems (Selected)  Obesity / 0035094662 / Probable  Bipolar disorder, unspecified / 86261635 / Confirmed  PTSD (post-traumatic stress disorder) / 07679946 / Confirmed  Hyperlipidemia / 50902303 / Confirmed  Prediabetes / 97810982 / Confirmed  History of meningitis / 074274435 / Confirmed  Mild intermittent asthma with acute exacerbation / 7482241355 / Confirmed  History of Clostridium difficile colitis / 526193425 / Confirmed      Objective   Vital Signs   2/13/2017 11:14 AM CST Peripheral Pulse Rate 84 bpm    Systolic Blood Pressure 122 mmHg    Diastolic Blood Pressure 84 mmHg    Mean Arterial Pressure 97 mmHg      Measurements from flowsheet : Measurements   2/13/2017 11:14 AM CST Height Measured - Standard 65 in    Weight Measured - Standard 209.4 lb    BSA 2.08 m2    Body Mass Index 34.84 kg/m2      General:  Alert and  oriented, No acute distress.    Musculoskeletal:  Normal strength, No tenderness, No swelling, No deformity.    Integumentary:  Warm.    Psychiatric:  Cooperative, Appropriate mood & affect.       Impression and Plan   Assessment and Plan:       Course: .         Diagnosis: Right arm pain (JPM08-RN M79.601).    He has had either some radicular problems or more of a tendonitis.  I certainly  do not mind putting a referral in so that he can talk with an orthopedist.

## 2022-02-16 NOTE — CARE COORDINATION
Care Coordinator ER Discharge Note    Sources of Information:  [ ] Patient, family member, or caregiver (Please list): CC tired contacting pt at 300pm on 5-22-17, call not able to be answered at this time per recording. CC tried contacting pt at 0929am on 5-23-17, call not able to be answered at this time per recording.  CC tried to contact pt at 1023am on 5/24/17, call not able to be answered at this time, per recording.  [X ] Hospital discharge summary  [ ] Hospital fax  [ ] List of recent hospitalizations or ED visits  [ ] Other:     Discharged From: [facility] ER  to home  Discharge Date: 05-20-17    Diagnosis/Problem: Rib contusion, right, subsequent encounter                                     Pneumonia of left lower lobe due to infectious organism    Medication Changes: [ X] Yes : Cefuroxime axetil 500mg and Zithromax Zpack 250mg given  Needs Follow-up Appointment:  [X ] Within 7 days of discharge (highly complex visit)  [ ] Within 14 days of discharge (moderately complex visit)    Appointment Made With:  Date:    Additional Information Needed and Requested:  [ ] Yes:  Push fluids, folllow up in clinic this week with Dr. Rosado for recheck.  Importance of taking deep breaths and staying active with chest wall pain was reviewed.

## 2022-03-02 VITALS — BODY MASS INDEX: 33.32 KG/M2 | HEIGHT: 65 IN | WEIGHT: 200 LBS

## 2022-03-02 NOTE — PROGRESS NOTES
Patient:   MISSY LOMAX            MRN: [id number]            FIN: [id number]               Age:   51 years     Sex:  Male     :  1970   Associated Diagnoses:   Prediabetes; Hyperlipidemia; Essential (primary) hypertension; Obesity   Author:   Lisa Fraser      Visit Information   Visit type:  Medical Nutrition Therapy.    Referral source:  Yanick LION, [facility].       Chief Complaint   Prediabetes, Obesity, CVD, Hyperlipidemia, HTN, Hypertriglyceridemia       Interval History   Pt with hx of prediabetes, unable to tolerate metformin and is here today to learn dietary/ lifestyle interventions to prevent diabetes.    Pt states he is quite active with walking daily and hand weights   Intake - recently changed from canned vegetables to frozen only or fresh, open to ideas, really not sure what he should be eating.        Health Status   Allergies:    Allergic Reactions (Selected)  Severity Not Documented  Amitriptyline (Rash)  Diflucan (No reactions were documented)  Nizoral (No reactions were documented)  Norco (No reactions were documented)  PredniSONE (Increased depression)  Vicodin (Vomiting)  Visine (No reactions were documented)   Medications:  (Selected)   Prescriptions  Prescribed  metoprolol succinate 50 mg oral tablet, extended release: 1 tab(s) ( 50 mg ), PO, Daily, # 30 tab(s), 5 Refill(s), Type: Maintenance, Pharmacy: [id number], 1 tab(s) po daily  Documented Medications  Documented  Effexor  mg oral capsule, extended release: 1 cap(s) ( 150 mg ), PO, daily, 0 Refill(s), Type: Maintenance  albuterol 90 mcg/inh inhalation aerosol: 2 puff(s), INH, QID, # 51 g, 0 Refill(s), Type: Maintenance  albuterol-ipratropium 2.5 mg-0.5 mg/3 mL inhalation solution: 3 mL, neb, qid, 0 Refill(s), Type: Maintenance  cloNIDine 0.1 mg oral tablet: 1 tab(s) ( 0.1 mg ), po, daily, Instructions: at HS, 0 Refill(s), Type: Maintenance  meclizine 25 mg oral tablet: = 1  tab(s) ( 25 mg ), Oral, tid, PRN: for nausea/vomiting, # 30 tab(s), 0 Refill(s), Type: Maintenance  multivitamin with minerals (w/ Iron): 0 Refill(s), Type: Maintenance  omeprazole 40 mg oral delayed release capsule: = 1 cap(s) ( 40 mg ), Oral, daily, # 90 cap(s), 0 Refill(s), Type: Maintenance   Problem list:    All Problems (Selected)  Vitamin D deficiency, unspecified / SNOMED CT 69066142 / Confirmed  Prediabetes / SNOMED CT 6049524181 / Confirmed  PTSD (post-traumatic stress disorder) / SNOMED CT 60698218 / Confirmed  Obesity / SNOMED CT 2457342539 / Probable  Unspecified exotropia / SNOMED CT 62091964 / Confirmed  Mild intermittent asthma with acute exacerbation / SNOMED CT 2155375570 / Confirmed  Migraine, unspecified, not intractable, without status migrainosus / SNOMED CT 66664151 / Confirmed  Low back pain, unspecified / SNOMED CT 843884577 / Confirmed  IBS (irritable bowel syndrome) / SNOMED CT 65672514 / Confirmed  Insomnia, unspecified / SNOMED CT 260170359 / Confirmed  Prediabetes / SNOMED CT 44439039 / Confirmed  Hypokalemia / SNOMED CT 79402142 / Confirmed  Hyperlipidemia / SNOMED CT 28636015 / Confirmed  History of meningitis / SNOMED CT 704416683 / Confirmed  History of Clostridium difficile colitis / SNOMED CT 928011482 / Confirmed  Generalized anxiety disorder / SNOMED CT 06586348 / Confirmed  Gastro-esophageal reflux disease without esophagitis / SNOMED CT 9145594907 / Confirmed  Essential (primary) hypertension / SNOMED CT 47572628 / Confirmed  Deviated nasal septum / SNOMED CT 307200455 / Confirmed  Chronic fatigue, unspecified / SNOMED CT 60318603 / Confirmed  CVA (cerebrovascular accident) / SNOMED CT 604164528 / Confirmed  Bipolar disorder, unspecified / SNOMED CT 90077121 / Confirmed  Benign paroxysmal vertigo, unspecified ear / SNOMED CT 463576304 / Confirmed      Histories   Past Medical History:    Active  History of meningitis (336987033): Onset on 12/24/2016 at 46 years.  History of  Clostridium difficile colitis (973545019): Onset on 12/24/2016 at 46 years.  Prediabetes (60405173): Onset on 2/12/2016 at 45 years.  Obesity (8656296193)  Bipolar disorder, unspecified (16545056)  PTSD (post-traumatic stress disorder) (77511978)  Hyperlipidemia (65317695)  Mild intermittent asthma with acute exacerbation (3559785585)  Prediabetes (7688829260)  Gastro-esophageal reflux disease without esophagitis (3410031037)  Migraine, unspecified, not intractable, without status migrainosus (55452554)  Vitamin D deficiency, unspecified (66258680)  Essential (primary) hypertension (88232641)  Deviated nasal septum (598737381)  Unspecified exotropia (58856439)  IBS (irritable bowel syndrome) (26362456)  Generalized anxiety disorder (91667642)  Chronic fatigue, unspecified (78568634)  Insomnia, unspecified (439144186)  Benign paroxysmal vertigo, unspecified ear (148856190)  Low back pain, unspecified (964729839)  CVA (cerebrovascular accident) (706187794)  Hypokalemia (13128016)  Resolved  Inpatient stay (158224739): Onset on 6/26/2017 at 46 years.  Resolved on 6/29/2017 at 46 years.  Comments:  7/10/2017 CDT 9:42 AM Abbey Fernandez  @[facility] - Encephalopathy  Inpatient stay (083706830): Onset on 1/10/2017 at 46 years.  Resolved on 1/11/2017 at 46 years.  Comments:  1/17/2017 CST 9:33 AM Abbey Drake  @[facility] - Emesis  Inpatient stay (997185457): Onset on 12/24/2016 at 46 years.  Resolved on 12/27/2016 at 46 years.  Comments:  1/10/2017 CST 7:31 AM Abbey Drake  @[facility] - Meningitis, influenza A, pneumonia right lower lobe, C difficile  Inpatient stay (849842955): Onset on 7/3/2016 at 45 years.  Resolved on 7/5/2016 at 45 years.  Comments:  1/10/2017 CST 7:32 AM Abbey Drake  @[facility] - Dental abscess   Family History:    Patient was adopted.    Procedure history:    Colonoscopy (SNOMED CT 406661903) on 11/28/2018 at 48 Years.  Lateral rectus recession and medial rectus resection (SNOMED CT 674424936)  on 3/28/2018 at 47 Years.  Flexible fiberoptic laryngoscopy (SNOMED CT 01139614) on 9/5/2017 at 46 Years.  MR angiography of head with contrast (SNOMED CT 2254247000) on 4/27/2016 at 45 Years.  MRA head (SNOMED CT 0056562158) on 4/27/2016 at 45 Years.  Comments:  5/3/2016 9:06 AM CDT - Mia Mac MA  Head and brain with IV contrast  Esophagogastroduodenoscopy (SNOMED CT 242683959) on 4/21/2016 at 45 Years.  Appendectomy (SNOMED CT 243597579).   Social History:        Electronic Cigarette/Vaping Assessment            Electronic Cigarette Use: Never.      Alcohol Assessment            Current, 1-2 times per week, 2 drinks/episode average.  2 drinks/episode maximum.                     Comments:                      11/11/2016 - Manuel Fry MD                     2 drinks per month      Tobacco Assessment            Former smoker, quit more than 30 days ago            Former smoker            Former smoker, Cigarettes                     Comments:                      11/19/2015 - Mirna Hogue CMA                     Quit 15yrs ago      Substance Abuse Assessment: Denies Substance Abuse            Never      Employment and Education Assessment            Employed                     Comments:                      11/11/2016 - Manuel Fry MD                     [employer]      Home and Environment Assessment            Marital status: Single.  Risks in environment: Smoke/CO detectors absent.      Exercise and Physical Activity Assessment            Exercise frequency: 3-4 times/week.  Exercise type: Walking, Weight lifting.      Sexual Assessment            Sexually active: No.  Identifies as male        Physical Examination   Measurements from flowsheet : Measurements   1/11/2022 2:19 PM CST Height Measured - Standard 65 in    Weight Measured - Standard 200 lb    BSA 2.04 m2    Body Mass Index 33.28 kg/m2  HI         Impression and Plan   Diagnosis     Prediabetes (GKK52-DM R73.03).      Hyperlipidemia (TBC72-CK E78.5).     Essential (primary) hypertension (FRF68-FY I10).     Obesity (FRG33-IV E66.9).       Education    Healthy Eating - Education on what foods are primary sources of carbohydrates and how intake affects BG readings, edu on carbohydrate counting, reading food labels, appropriate portion sizes, well balanced meals, diabetic plate method as a general rule.      Dietary intervention to lower lipids  * moderate carbohydrate intake 30gm/ meal; 15 snacks  * choose healthy fats   - dietary sources of omega 3 FA 2-3x/ week   - unsaturated fats, eliminate trans fats  * soluble fiber 10 - 15 gm/ day   * increase dietary fiber    - increase fruit to 3 servings/ day    - nonstarchy vegetables unlimited 3+/day    - whole grain/ high fiber starches  * soy protein optimal 25gm daily   * nuts   - walnuts, almonds, pistachios, hazelnuts    Foods recommended/ foods not recommended/ menu provided     Being Active - Education on how exercise helps with :    - lowering BG by increasing the muscles ability to take up and use glucose   - weight loss   - healthier heart (improve lipid profile)   - improve sleep, mood, energy   - decrease stress      Monitoring - yearly a1c and cholesterol or per PCP recommendations  Taking Medication - Will not start on medication at this time     Pt was receptive to education, asked appropriate questions, and was motivated to implement dietary changes.        Goals:   1.  Continue with physical activity routine 30+ min daily   2.  Total Carbohydrate intake 30 grams for meals, snacks ~ 15 grams    Name/ number provided if questions arise.    Pt will continue to have yearly glucose.        Professional Services   Time spent with pt 30 min   cc Dr. Herndon   cc Dr. Briones

## 2022-03-02 NOTE — NURSING NOTE
Quick Intake Entered On:  1/11/2022 2:19 PM CST    Performed On:  1/11/2022 2:19 PM CST by Lisa Fraser               Summary   Weight Measured :   200 lb(Converted to: 200 lb 0 oz, 90.718 kg)    Height Measured :   65 in(Converted to: 5 ft 5 in, 165.10 cm)    Body Mass Index :   33.28 kg/m2 (HI)    Body Surface Area :   2.04 m2   Lisa Fraser - 1/11/2022 2:19 PM CST